# Patient Record
Sex: FEMALE | Race: ASIAN | Employment: FULL TIME | URBAN - METROPOLITAN AREA
[De-identification: names, ages, dates, MRNs, and addresses within clinical notes are randomized per-mention and may not be internally consistent; named-entity substitution may affect disease eponyms.]

---

## 2022-11-11 ENCOUNTER — OFFICE VISIT (OUTPATIENT)
Dept: URGENT CARE | Facility: CLINIC | Age: 47
End: 2022-11-11

## 2022-11-11 VITALS
HEART RATE: 75 BPM | DIASTOLIC BLOOD PRESSURE: 68 MMHG | OXYGEN SATURATION: 99 % | SYSTOLIC BLOOD PRESSURE: 112 MMHG | TEMPERATURE: 99.7 F | RESPIRATION RATE: 18 BRPM

## 2022-11-11 DIAGNOSIS — U07.1 COVID-19: Primary | ICD-10-CM

## 2022-11-11 PROBLEM — Z86.32 HISTORY OF GESTATIONAL DIABETES: Status: ACTIVE | Noted: 2017-02-14

## 2022-11-11 PROBLEM — E11.9 TYPE 2 DIABETES MELLITUS WITHOUT COMPLICATION (HCC): Status: ACTIVE | Noted: 2017-02-14

## 2022-11-11 RX ORDER — NIRMATRELVIR AND RITONAVIR 300-100 MG
3 KIT ORAL 2 TIMES DAILY
Qty: 30 TABLET | Refills: 0 | Status: SHIPPED | OUTPATIENT
Start: 2022-11-11 | End: 2022-11-16

## 2022-11-11 NOTE — PROGRESS NOTES
[facility]    NAME: Braxton Wilson is a 41 y o  female  : 1975    MRN: [id number]  DATE: 2022  TIME: 6:28 PM    Assessment and Plan   COVID-19 [U07 1]  1  COVID-19       At home COVID was positive,   Pt meets criteria for paxlovid due to diabetes and stroke  Discussed with patient that this is an emergency use authorization medications  That known side effects include rebound COVID, kidney and liver damage  That this is a new medication so that new side effects could occur  After explaining all of this an their risk factors for COVID pt agreed to take the Paxlovid medication  Handout given to patient on paxlovid and told to read before taking the medication  Continue supportive care for symptoms  Educated on return precautions to PCP or to ED  Patient Instructions       Keep hydrated and rest as able  COVID is positive you need to quarantine until your fever is gone for 24 hours and days 0-5  The first day of symptoms is day 0  Then, you need to have strict mask wearing days 6 through 10  Notify those you were in contact with of exposure from 2 days before you had symptoms through your positive test result  Wear your mask and wash hands often  PCP follow up in 3-5 days; call them first  Go to an emergency department if symptoms worsen, especially shortness or breath, weakness, or if unable to tolerate fluid intake  Chief Complaint     Chief Complaint   Patient presents with   • COVID-19     Pt tested positive on Tuesday with covid would like antiviral c/o sore throat and cough         History of Present Illness       Presents with sick symptoms of sore throat and cough  She tested positive 4 days ago  Denies shortness of breath symptoms  Feels symptoms are progressing  PMH including stroke and diabetes  No previous COVID infection, vaccinated x2  Review of Systems   Review of Systems   Constitutional: Positive for fever  Negative for chills and fatigue     HENT: Positive for congestion and sore throat  Smell loss   Respiratory: Positive for cough  Negative for shortness of breath and wheezing  Cardiovascular: Negative for chest pain  Gastrointestinal: Negative for abdominal pain  Genitourinary: Negative for dysuria  Musculoskeletal: Negative for myalgias  Neurological: Negative for dizziness  Psychiatric/Behavioral: Negative for confusion  Current Medications     No current outpatient medications on file  Current Allergies     Allergies as of 2022 - Reviewed 2022   Allergen Reaction Noted   • Azithromycin Hives and Rash 2018            The following portions of the patient's history were reviewed and updated as appropriate: allergies, current medications, past family history, past medical history, past social history, past surgical history and problem list      History reviewed  No pertinent past medical history  Past Surgical History:   Procedure Laterality Date   •  SECTION         History reviewed  No pertinent family history  Medications have been verified  Objective   /68   Pulse 75   Temp 99 7 °F (37 6 °C) (Temporal)   Resp 18   SpO2 99%        Physical Exam     Physical Exam  Vitals reviewed  Constitutional:       General: She is not in acute distress  Appearance: Normal appearance  HENT:      Right Ear: Tympanic membrane, ear canal and external ear normal       Left Ear: Tympanic membrane, ear canal and external ear normal       Nose: Congestion present  Mouth/Throat:      Mouth: Mucous membranes are moist       Pharynx: No posterior oropharyngeal erythema  Eyes:      Conjunctiva/sclera: Conjunctivae normal    Cardiovascular:      Rate and Rhythm: Normal rate and regular rhythm  Pulses: Normal pulses  Heart sounds: Normal heart sounds  No murmur heard  Pulmonary:      Effort: Pulmonary effort is normal  No respiratory distress        Breath sounds: Normal breath sounds  Musculoskeletal:         General: Normal range of motion  Skin:     General: Skin is warm and dry  Neurological:      General: No focal deficit present  Mental Status: She is alert and oriented to person, place, and time     Psychiatric:         Mood and Affect: Mood normal          Behavior: Behavior normal